# Patient Record
Sex: MALE
[De-identification: names, ages, dates, MRNs, and addresses within clinical notes are randomized per-mention and may not be internally consistent; named-entity substitution may affect disease eponyms.]

---

## 2018-12-30 ENCOUNTER — HOSPITAL ENCOUNTER (EMERGENCY)
Dept: HOSPITAL 97 - ER | Age: 28
Discharge: HOME | End: 2018-12-30
Payer: COMMERCIAL

## 2018-12-30 DIAGNOSIS — R10.2: Primary | ICD-10-CM

## 2018-12-30 DIAGNOSIS — R11.2: ICD-10-CM

## 2018-12-30 LAB
ALBUMIN SERPL BCP-MCNC: 3.7 G/DL (ref 3.4–5)
ALP SERPL-CCNC: 91 U/L (ref 45–117)
ALT SERPL W P-5'-P-CCNC: 37 U/L (ref 12–78)
AST SERPL W P-5'-P-CCNC: 13 U/L (ref 15–37)
BUN BLD-MCNC: 11 MG/DL (ref 7–18)
GLUCOSE SERPLBLD-MCNC: 91 MG/DL (ref 74–106)
HCT VFR BLD CALC: 51.2 % (ref 39.6–49)
LIPASE SERPL-CCNC: 108 U/L (ref 73–393)
LYMPHOCYTES # SPEC AUTO: 3 K/UL (ref 0.7–4.9)
PMV BLD: 8.1 FL (ref 7.6–11.3)
POTASSIUM SERPL-SCNC: 4.3 MMOL/L (ref 3.5–5.1)
RBC # BLD: 5.98 M/UL (ref 4.33–5.43)

## 2018-12-30 PROCEDURE — 96374 THER/PROPH/DIAG INJ IV PUSH: CPT

## 2018-12-30 PROCEDURE — 85025 COMPLETE CBC W/AUTO DIFF WBC: CPT

## 2018-12-30 PROCEDURE — 99284 EMERGENCY DEPT VISIT MOD MDM: CPT

## 2018-12-30 PROCEDURE — 74177 CT ABD & PELVIS W/CONTRAST: CPT

## 2018-12-30 PROCEDURE — 80048 BASIC METABOLIC PNL TOTAL CA: CPT

## 2018-12-30 PROCEDURE — 96375 TX/PRO/DX INJ NEW DRUG ADDON: CPT

## 2018-12-30 PROCEDURE — 96361 HYDRATE IV INFUSION ADD-ON: CPT

## 2018-12-30 PROCEDURE — 83690 ASSAY OF LIPASE: CPT

## 2018-12-30 PROCEDURE — 80076 HEPATIC FUNCTION PANEL: CPT

## 2018-12-30 PROCEDURE — 36415 COLL VENOUS BLD VENIPUNCTURE: CPT

## 2018-12-30 NOTE — ER
Nurse's Notes                                                                                     

 [facility]                                                                

Name: Srinivas Alcantara                                                                                

Age: 28 yrs                                                                                       

Sex: Male                                                                                         

: 1990                                                                                   

MRN: [id number]                                                                                   

Arrival Date: 2018                                                                          

Time: 19:14                                                                                       

Account#: [id number]                                                                            

Bed 13                                                                                            

Private MD:                                                                                       

Diagnosis: Abdominal and pelvic pain                                                              

                                                                                                  

Presentation:                                                                                     

                                                                                             

19:42 Presenting complaint: Patient states: that yesterday am he started to have epigastric   fc  

      pain that then spread to his right upper abd. Also having nausea and vomiting.              

      Transition of care: patient was not received from another setting of care. Onset of         

      symptoms was 2018. Risk Assessment: Do you want to hurt yourself or            

      someone else? Patient reports no desire to harm self or others. Initial Sepsis Screen:      

      Does the patient meet any 2 criteria? HR > 90 bpm. No. Patient's initial sepsis screen      

      is negative. Does the patient have a suspected source of infection? No. Patient's           

      initial sepsis screen is negative. Care prior to arrival: None.                             

19:42 Method Of Arrival: Ambulatory                                                             

19:42 Acuity: KEY 3                                                                           fc  

                                                                                                  

Triage Assessment:                                                                                

19:45 General: Appears uncomfortable, Behavior is calm, cooperative, appropriate for age.     fc  

      Pain: Complains of pain in epigastric area and right upper quadrant Pain currently is 8     

      out of 10 on a pain scale. Quality of pain is described as aching, dull, sharp, Pain        

      began 1 day ago. Is continuous, Aggravated by eating, drinking. EENT: No deficits           

      noted. Neuro: Level of Consciousness is awake, alert, obeys commands, Oriented to           

      person, place, time, situation, Appropriate for age. Cardiovascular: No deficits noted.     

      Respiratory: No deficits noted. GI: Abdomen is round non-distended, Bowel sounds            

      present X 4 quads. Abd is soft Abdomen is tender to palpation in epigastric area and        

      right upper quadrant Reports upper abdominal pain, epigastric pain, nausea, vomiting,       

      Patient currently denies constipation, diarrhea. : No deficits noted. Derm: Skin is       

      pink, warm \T\ dry.                                                                         

                                                                                                  

Historical:                                                                                       

- Allergies:                                                                                      

19:45 No Known Allergies;                                                                     fc  

- Home Meds:                                                                                      

19:45 None [Active];                                                                          fc  

- PMHx:                                                                                           

19:45 None;                                                                                   fc  

- PSHx:                                                                                           

19:45 Gastric Bypass;                                                                         fc  

                                                                                                  

- Immunization history:: Last tetanus immunization: up to date Flu vaccine is not up to           

  date.                                                                                           

- Social history:: Smoking status: Patient/guardian denies using tobacco, Patient uses            

  alcohol, occasionally. Patient/guardian denies using street drugs.                              

- Ebola Screening: : Patient negative for fever greater than or equal to 101.5 degrees            

  Fahrenheit, and additional compatible Ebola Virus Disease symptoms Patient denies               

  exposure to infectious person Patient denies travel to an Ebola-affected area in the            

  21 days before illness onset.                                                                   

                                                                                                  

                                                                                                  

Screenin:15 Abuse screen: Denies threats or abuse. Nutritional screening: No deficits noted.        jb4 

      Tuberculosis screening: No symptoms or risk factors identified. Fall Risk None              

      identified.                                                                                 

                                                                                                  

Assessment:                                                                                       

21:30 General: Appears in no apparent distress. uncomfortable, Behavior is calm, cooperative, jb4 

      appropriate for age. Pain: Complains of pain in abdomen Pain does not radiate. Pain         

      currently is 8 out of 10 on a pain scale. Quality of pain is described as stabbing,         

      Pain began 1 day ago. Neuro: Level of Consciousness is awake, alert, obeys commands,        

      Oriented to person, place, time, situation. Cardiovascular: Patient's skin is warm and      

      dry. Respiratory: Airway is patent Respiratory effort is even, unlabored, Respiratory       

      pattern is regular, symmetrical. GI: Abdomen is flat, non-distended, Bowel sounds           

      present X 4 quads. Abd is soft X 4 quads Abd is non tender in left upper quadrant,          

      right lower quadrant and left lower quadrant Abdomen is tender to palpation in              

      epigastric area and right upper quadrant. : No signs and/or symptoms were reported        

      regarding the genitourinary system. EENT: No signs and/or symptoms were reported            

      regarding the EENT system. Derm: Skin is intact, Skin is pink, warm \T\ dry.                

      Musculoskeletal: Circulation, motion, and sensation intact.                                 

22:29 Reassessment: Patient appears in no apparent distress at this time. Patient and/or      jb4 

      family updated on plan of care and expected duration. Pain level reassessed. Patient is     

      alert, oriented x 3, equal unlabored respirations, skin warm/dry/pink.                      

23:00 Reassessment: Patient appears in no apparent distress at this time. Patient and/or      jb4 

      family updated on plan of care and expected duration. Pain level reassessed. Patient is     

      alert, oriented x 3, equal unlabored respirations, skin warm/dry/pink.                      

23:46 Reassessment: Patient appears in no apparent distress at this time. Patient and/or      jb4 

      family updated on plan of care and expected duration. Pain level reassessed. Patient is     

      alert, oriented x 3, equal unlabored respirations, skin warm/dry/pink. Discussed D/c,       

      F/u with Pt, denies questions or concerns.                                                  

                                                                                                  

Vital Signs:                                                                                      

19:45  / 81; Pulse 89; Resp 18; Temp 98.7(O); Pulse Ox 98% on R/A; Weight 104.33 kg     fc  

      (R); Height 5 ft. 9 in. (175.26 cm) (R); Pain 8/10;                                         

21:15  / 92; Pulse 75; Resp 16; Pulse Ox 100% ;                                         jb4 

22:00  / 71; Pulse 74; Resp 16; Pulse Ox 100% on R/A;                                   jb4 

23:00  / 84; Pulse 75; Resp 16; Pulse Ox 100% on R/A;                                   jb4 

19:45 Body Mass Index 33.96 (104.33 kg, 175.26 cm)                                              

                                                                                                  

ED Course:                                                                                        

19:14 Patient arrived in ED.                                                                  es  

19:44 Triage completed.                                                                         

19:45 Arm band placed on Patient placed in waiting room, Patient notified of wait time.         

21:10 Flakito Talbot, RN is Primary Nurse.                                                     4 

21:14 Norbert Dwyer PA is PHCP.                                                               jr8 

21:14 Mario Ybarra MD is Attending Physician.                                              [id number] 

21:15 Patient has correct armband on for positive identification. Bed in low position. Call   jb4 

      light in reach. Side rails up X 1. Pulse ox on. NIBP on.                                    

21:32 Radiology exam delayed due to lab results not completed at this time. (BUN/Creatinine). vm2 

21:45 Initial lab(s) drawn, by me, sent to lab. Inserted saline lock: 20 gauge in right       jb4 

      antecubital area, using aseptic technique. Blood collected.                                 

21:52 Radiology exam delayed due to lab results not completed at this time. (BUN/Creatinine). vm2 

22:23 Patient moved to CT via wheelchair.                                                     vm2 

22:37 CT completed. Patient tolerated procedure well. Patient moved back from CT.             vm2 

22:43 CT Abd/Pelvis - W/Contrast In Process Unspecified.                                      EDMS

23:46 No provider procedures requiring assistance completed. IV discontinued, intact,         jb4 

      bleeding controlled.                                                                        

                                                                                                  

Administered Medications:                                                                         

21:54 Drug: Zofran 4 mg Route: IVP; Site: right antecubital;                                  jb4 

23:48 Follow up: Response: No adverse reaction; Nausea is decreased                           jb4 

21:55 Drug: NS 0.9% 1000 ml Route: IV; Rate: 1000 ml; Site: right antecubital;                jb4 

22:30 Follow up: Response: No adverse reaction; IV Status: Completed infusion                 jb4 

21:58 Drug: morphine 4 mg Route: IVP; Site: right antecubital;                                jb4 

23:48 Follow up: Response: No adverse reaction; Pain is decreased                             jb4 

                                                                                                  

                                                                                                  

Outcome:                                                                                          

23:37 Discharge ordered by MD.                                                                adina 

23:47 Discharged to home ambulatory.                                                          jb4 

23:47 Condition: stable                                                                           

23:47 Discharge instructions given to patient, Instructed on discharge instructions, follow       

      up and referral plans. medication usage, Demonstrated understanding of instructions,        

      follow-up care, medications, Prescriptions given X 3.                                       

23:49 Patient left the ED.                                                                    jb4 

                                                                                                  

Signatures:                                                                                       

Dispatcher [employer]                           Olya Davis Felicia, RN                   RN   Norbert Grant PA PA   jr8                                                  

Flakito Talbot RN                       RN   jb4                                                  

Ethel Javier                            [facility]                                                  

                                                                                                  

**************************************************************************************************

## 2018-12-30 NOTE — EDPHYS
Physician Documentation                                                                           

 [facility]                                                                

Name: Srinivas Alcantara                                                                                

Age: 28 yrs                                                                                       

Sex: Male                                                                                         

: 1990                                                                                   

MRN: [id number]                                                                                   

Arrival Date: 2018                                                                          

Time: 19:14                                                                                       

Account#: [id number]                                                                            

Bed 13                                                                                            

Private MD:                                                                                       

ED Physician Mario Ybarra                                                                       

HPI:                                                                                              

                                                                                             

21:49 This 28 yrs old  Male presents to ER via Ambulatory with complaints of          jr8 

      Abdominal Pain.                                                                             

21:49 The patient presents with abdominal pain in the upper abdomen. Onset: The               jr8 

      symptoms/episode began/occurred acutely, yesterday. The symptoms do not radiate.            

      Associated signs and symptoms: Pertinent positives: nausea and vomiting. The symptoms       

      are described as crampy, stabbing. Modifying factors: The symptoms are alleviated by        

      nothing, the symptoms are aggravated by food. Severity of pain: At its worst the pain       

      was moderate in the emergency department the pain is unchanged. The patient has not         

      experienced similar symptoms in the past. The patient has not recently seen a physician.    

                                                                                                  

Historical:                                                                                       

- Allergies:                                                                                      

19:45 No Known Allergies;                                                                     fc  

- Home Meds:                                                                                      

19:45 None [Active];                                                                          fc  

- PMHx:                                                                                           

19:45 None;                                                                                   fc  

- PSHx:                                                                                           

19:45 Gastric Bypass;                                                                         fc  

                                                                                                  

- Immunization history:: Last tetanus immunization: up to date Flu vaccine is not up to           

  date.                                                                                           

- Social history:: Smoking status: Patient/guardian denies using tobacco, Patient uses            

  alcohol, occasionally. Patient/guardian denies using street drugs.                              

- Ebola Screening: : Patient negative for fever greater than or equal to 101.5 degrees            

  Fahrenheit, and additional compatible Ebola Virus Disease symptoms Patient denies               

  exposure to infectious person Patient denies travel to an Ebola-affected area in the            

  21 days before illness onset.                                                                   

                                                                                                  

                                                                                                  

ROS:                                                                                              

21:49 Eyes: Negative for injury, pain, redness, and discharge, ENT: Negative for injury,      jr8 

      pain, and discharge, Neck: Negative for injury, pain, and swelling, Cardiovascular:         

      Negative for chest pain, palpitations, and edema, Respiratory: Negative for shortness       

      of breath, cough, wheezing, and pleuritic chest pain, Back: Negative for injury and         

      pain, MS/Extremity: Negative for injury and deformity, Skin: Negative for injury, rash,     

      and discoloration, Neuro: Negative for headache, weakness, numbness, tingling, and          

      seizure.                                                                                    

21:49 Abdomen/GI: Positive for abdominal pain, nausea and vomiting, Negative for diarrhea,        

      constipation, abdominal cramps, abdominal distension, anorexia, dysphagia, hematemesis,     

      black/tarry stool, rectal pain, rectal bleeding, bowel incontinence, flatulence.            

                                                                                                  

Exam:                                                                                             

21:49 Eyes:  Pupils equal round and reactive to light, extra-ocular motions intact.  Lids and jr8 

      lashes normal.  Conjunctiva and sclera are non-icteric and not injected.  Cornea within     

      normal limits.  Periorbital areas with no swelling, redness, or edema. ENT:  Nares          

      patent. No nasal discharge, no septal abnormalities noted.  Tympanic membranes are          

      normal and external auditory canals are clear.  Oropharynx with no redness, swelling,       

      or masses, exudates, or evidence of obstruction, uvula midline.  Mucous membranes           

      moist. Neck:  Trachea midline, no thyromegaly or masses palpated, and no cervical           

      lymphadenopathy.  Supple, full range of motion without nuchal rigidity, or vertebral        

      point tenderness.  No Meningismus. Cardiovascular:  Regular rate and rhythm with a          

      normal S1 and S2.  No gallops, murmurs, or rubs.  Normal PMI, no JVD.  No pulse             

      deficits. Respiratory:  Lungs have equal breath sounds bilaterally, clear to                

      auscultation and percussion.  No rales, rhonchi or wheezes noted.  No increased work of     

      breathing, no retractions or nasal flaring. Back:  No spinal tenderness.  No                

      costovertebral tenderness.  Full range of motion. Skin:  Warm, dry with normal turgor.      

      Normal color with no rashes, no lesions, and no evidence of cellulitis. MS/ Extremity:      

      Pulses equal, no cyanosis.  Neurovascular intact.  Full, normal range of motion. Neuro:     

       Awake and alert, GCS 15, oriented to person, place, time, and situation.  Cranial          

      nerves II-XII grossly intact.  Motor strength 5/5 in all extremities.  Sensory grossly      

      intact.  Cerebellar exam normal.  Normal gait.                                              

21:49 Abdomen/GI: Inspection: obese Bowel sounds: active, all quadrants, Palpation: soft, in      

      all quadrants, moderate abdominal tenderness, in the mid abdomen, mass, is not              

      appreciated, rebound tenderness, is not appreciated, voluntary guarding, is not             

      appreciated, involuntary guarding, is not appreciated, no appreciated organomegaly,         

      Indicators: McBurney's point is not tender, Natarajan's sign is negative, Rovsing's sign       

      is negative, Liver: tenderness, is not appreciated.                                         

                                                                                                  

Vital Signs:                                                                                      

19:45  / 81; Pulse 89; Resp 18; Temp 98.7(O); Pulse Ox 98% on R/A; Weight 104.33 kg     fc  

      (R); Height 5 ft. 9 in. (175.26 cm) (R); Pain 8/10;                                         

21:15  / 92; Pulse 75; Resp 16; Pulse Ox 100% ;                                         jb4 

22:00  / 71; Pulse 74; Resp 16; Pulse Ox 100% on R/A;                                   jb4 

23:00  / 84; Pulse 75; Resp 16; Pulse Ox 100% on R/A;                                   jb4 

19:45 Body Mass Index 33.96 (104.33 kg, 175.26 cm)                                            fc  

                                                                                                  

MDM:                                                                                              

21:15 Patient medically screened.                                                             jr8 

23:36 Data reviewed: vital signs, nurses notes, lab test result(s), radiologic studies, CT    jr8 

      scan, and as a result, I will discharge patient. Data interpreted: Pulse oximetry: on       

      room air is 100 %. Interpretation: normal. Counseling: I had a detailed discussion with     

      the patient and/or guardian regarding: the historical points, exam findings, and any        

      diagnostic results supporting the discharge/admit diagnosis, lab results, radiology         

      results, the need for outpatient follow up, a family practitioner, to return to the         

      emergency department if symptoms worsen or persist or if there are any questions or         

      concerns that arise at home. Response to treatment: the patient's symptoms have             

      markedly improved after treatment.                                                          

                                                                                                  

                                                                                             

21:28 Order name: Basic Metabolic Panel; Complete Time: 22:                                 jr8 

                                                                                             

21:28 Order name: CBC with Diff; Complete Time: 22:                                         jr8 

                                                                                             

21:28 Order name: Creatinine for Radiology; Complete Time: 22:                              jr8 

                                                                                             

21:28 Order name: Hepatic Function; Complete Time: 22:                                      jr8 

                                                                                             

21:28 Order name: Lipase; Complete Time: 22:                                                jr8 

                                                                                             

21:28 Order name: IV Saline Lock; Complete Time: 21:51                                        jr8 

                                                                                             

21:28 Order name: Labs collected and sent; Complete Time: :51                               jr8 

                                                                                             

21:29 Order name: CT Abd/Pelvis - W/Contrast                                                  jr8 

                                                                                                  

Administered Medications:                                                                         

21:54 Drug: Zofran 4 mg Route: IVP; Site: right antecubital;                                  jb4 

23:48 Follow up: Response: No adverse reaction; Nausea is decreased                           jb4 

21:55 Drug: NS 0.9% 1000 ml Route: IV; Rate: 1000 ml; Site: right antecubital;                jb4 

22:30 Follow up: Response: No adverse reaction; IV Status: Completed infusion                 jb4 

21:58 Drug: morphine 4 mg Route: IVP; Site: right antecubital;                                jb4 

23:48 Follow up: Response: No adverse reaction; Pain is decreased                             jb4 

                                                                                                  

                                                                                                  

Disposition:                                                                                      

                                                                                             

03:40 Co-signature as Attending Physician, Mario Ybarra MD.                                    

                                                                                                  

Disposition:                                                                                      

18 23:37 Discharged to Home. Impression: Abdominal and pelvic pain.                         

- Condition is Stable.                                                                            

                                                                                                  

- Prescriptions for Bentyl 20 mg Oral Tablet - take 1 tablet by ORAL route every 6                

  hours As needed; 20 tablet. Zofran 4 mg Oral Tablet - take 1 tablet by ORAL route               

  every 12 hours As needed; 20 tablet. Tramadol 50 mg Oral Tablet - take 1 tablet by              

  ORAL route every 8 hours as needed; 12 tablet.                                                  

- Medication Reconciliation Form, Thank You Letter, Antibiotic Education, Prescription            

  Opioid Use form.                                                                                

- Follow up: Private Physician; When: 5 - 6 days; Reason: Recheck today's complaints,             

  Continuance of care, Re-evaluation by your physician.                                           

- Problem is new.                                                                                 

- Symptoms have improved.                                                                         

                                                                                                  

                                                                                                  

                                                                                                  

Signatures:                                                                                       

Dispatcher [employer]                           EDMS                                                 

Theresa Mixon RN RN fc Roszak, Josh, PA PA   jr8                                                  

Flakito Talbot RN RN   jb4                                                  

Mario Ybarra MD MD                                                      

                                                                                                  

Corrections: (The following items were deleted from the chart)                                    

                                                                                             

23:05 20:23 Abdomen Limited+US.RAD.BRZ ordered. [facility]

23:49 23:37 2018 23:37 Discharged to Home. Impression: Abdominal and pelvic pain.       jb4 

      Condition is Stable. Forms are Medication Reconciliation Form, Thank You Letter,            

      Antibiotic Education, Prescription Opioid Use. Follow up: Private Physician; When: 5 -      

      6 days; Reason: Recheck today's complaints, Continuance of care, Re-evaluation by your      

      physician. Problem is new. Symptoms have improved. jr8                                      

                                                                                                  

**************************************************************************************************

## 2018-12-31 NOTE — RAD REPORT
EXAM DESCRIPTION:  CTAbdomen   Pelvis W Contrast - 12/30/2018 10:42 pm

 

CLINICAL HISTORY:  Abdominal pain.

iv only;Abd pain

 

COMPARISON:  No comparisons

 

TECHNIQUE:  Biphasic CT imaging of the abdomen and pelvis was performed with 100 ml non-ionic IV cont
rast.

 

All CT scans are performed using dose optimization technique as appropriate and may include automated
 exposure control or mA/KV adjustment according to patient size.

 

FINDINGS:  The lung bases are clear.

 

The liver, spleen, pancreas, adrenal glands and kidneys are within normal limits. Postsurgical change
s of gastric bypass procedure noted.

 

No bowel obstruction, free air, free fluid or abscess.  The appendix is normal.  No evidence of signi
ficant lymphadenopathy.

 

No suspicious bony findings.

 

IMPRESSION:  No acute intra-abdominal or pelvic finding.